# Patient Record
(demographics unavailable — no encounter records)

---

## 2025-05-13 NOTE — ASSESSMENT
[FreeTextEntry1] : Diagnosis: Ureteral reimplant, Complex renal Cyst   Plan: PVR was 1 cc UCx from 4/17/2025 with mixed  grayson, no infection currently Check UA for presence of microhematuria persistent left sided discomfort but we did discuss that this could be orthopedic in nature given location.  Suggest she be evaluated by orthopedics. CTU from 9/2023 with evidence of left urothelial thickening involving left distal ureter to the anastomosis.  Plan repeat imaging, if ureteral thickening still present then needs cystosocpy Complex renal cyst seen on CT from 9/2023-has not had follow up imaging, will evaluate on CT ordered  RTC pending CT results

## 2025-05-13 NOTE — HISTORY OF PRESENT ILLNESS
[FreeTextEntry1] : CC: History of left ureteral injury, s/p reimplant  History of [facility]/JOAN, b/l salpingectomy, left oophorectomy, adhesiolysis with appendectomy on 3/18/22, which was complicated by left ureteral injury noted 3 days after initial surgery which eventually required placement of nephrostomy tube.   Postoperatively she has had a history of pain in left flank area when she voids and also reports suprapubic discomfort, likely reflux related  Plan was to perform a cystoscopy back in 2023 given thickening on bladder/ureter from CT. Also complex cyst left lower pole-has not had any repeat imaging.  Renal Lasix scan 11/2023 1. No evidence of urinary obstruction. Perfusion of the kidneys appears symmetric. 2. Activity overlying the distal left ureter at the end of the study suggestive of vesicoureteral reflux. 3. Normal left-right functional differential of 41%-59%.  UCx from 4/2025 was mixed genital grayson  PVR today was 1 cc  Occasional shooting cramping sensation left lower back   Creatinine 1.26 on 3/25/25

## 2025-05-13 NOTE — PHYSICAL EXAM
[General Appearance - Well Developed] : well developed [General Appearance - Well Nourished] : well nourished [Normal Appearance] : normal appearance [Well Groomed] : well groomed [General Appearance - In No Acute Distress] : no acute distress [] : no respiratory distress [Respiration, Rhythm And Depth] : normal respiratory rhythm and effort [Exaggerated Use Of Accessory Muscles For Inspiration] : no accessory muscle use [Normal Station and Gait] : the gait and station were normal for the patient's age [No Focal Deficits] : no focal deficits [Oriented To Time, Place, And Person] : oriented to person, place, and time [Affect] : the affect was normal [Mood] : the mood was normal [Not Anxious] : not anxious [Abdomen Soft] : soft [Abdomen Mass (___ Cm)] : no abdominal mass palpated [Abdomen Hernia] : no hernia was discovered [Costovertebral Angle Tenderness] : no ~M costovertebral angle tenderness